# Patient Record
Sex: MALE | URBAN - METROPOLITAN AREA
[De-identification: names, ages, dates, MRNs, and addresses within clinical notes are randomized per-mention and may not be internally consistent; named-entity substitution may affect disease eponyms.]

---

## 2021-04-20 ENCOUNTER — OFFICE VISIT (OUTPATIENT)
Dept: URBAN - METROPOLITAN AREA CLINIC 105 | Facility: CLINIC | Age: 76
End: 2021-04-20
Payer: MEDICARE

## 2021-04-20 ENCOUNTER — LAB OUTSIDE AN ENCOUNTER (OUTPATIENT)
Dept: URBAN - METROPOLITAN AREA CLINIC 105 | Facility: CLINIC | Age: 76
End: 2021-04-20

## 2021-04-20 DIAGNOSIS — K21.9 GASTROESOPHAGEAL REFLUX DISEASE, UNSPECIFIED WHETHER ESOPHAGITIS PRESENT: ICD-10-CM

## 2021-04-20 DIAGNOSIS — R13.12 OROPHARYNGEAL DYSPHAGIA: ICD-10-CM

## 2021-04-20 PROCEDURE — 99214 OFFICE O/P EST MOD 30 MIN: CPT | Performed by: INTERNAL MEDICINE

## 2021-04-20 RX ORDER — ZOLPIDEM TARTRATE 10 MG/1
TAKE 1 TABLET (10 MG) BY ORAL ROUTE ONCE DAILY AT BEDTIME TABLET, FILM COATED ORAL 1
Qty: 0 | Refills: 0 | Status: ACTIVE | COMMUNITY
Start: 1900-01-01

## 2021-04-20 RX ORDER — METOPROLOL TARTRATE 25 MG/1
TAKE 1 TABLET (25 MG) BY ORAL ROUTE ONCE DAILY TABLET, FILM COATED ORAL 1
Qty: 0 | Refills: 0 | Status: ACTIVE | COMMUNITY
Start: 1900-01-01

## 2021-04-20 RX ORDER — OMEPRAZOLE 40 MG/1
1 CAPSULE 30 MINUTES BEFORE MORNING MEAL CAPSULE, DELAYED RELEASE ORAL ONCE A DAY
Qty: 90 | Refills: 1 | OUTPATIENT
Start: 2021-04-20

## 2021-04-20 RX ORDER — ROSUVASTATIN CALCIUM 20 MG/1
1 TABLET TABLET, FILM COATED ORAL ONCE A DAY
Status: ACTIVE | COMMUNITY

## 2021-04-20 RX ORDER — ASPIRIN 81 MG/1
TAKE 1 TABLET (81 MG) BY ORAL ROUTE ONCE DAILY TABLET, COATED ORAL 1
Qty: 0 | Refills: 0 | Status: ACTIVE | COMMUNITY
Start: 1900-01-01

## 2021-04-20 RX ORDER — LEVOTHYROXINE SODIUM 0.1 MG/1
TAKE 1 TABLET (100 MCG) BY ORAL ROUTE ONCE DAILY ON AN EMPTY STOMACH 30 MINUTES BEFORE BREAKFAST TABLET ORAL 1
Qty: 0 | Refills: 0 | Status: ACTIVE | COMMUNITY
Start: 1900-01-01

## 2021-04-20 NOTE — HPI-TODAY'S VISIT:
The patient presents on follow-up after a colonoscopy.  On 1/7/20, he said he had a colon 10 years ago. He said his difficulty swallowing was a complication of the radiation he was receiving for tongue cancer. He had most recently followed at [facility] for this. Dr. Mena at [place] is his PCP. He had heart surgery with Ben Kaminski at [facility] in 2016. Dr. Trinh at [place] was his radiation oncologist. He denied pneumonia from aspiration.   Today, reported symptoms are indigestion, heartburn, and tongue burning. He thinks he took Nexium only once. He says reflux has been an issue for more than 5 years. He thinks he was treated for reflux w/ Dr. Crowell previously. He doesn't drink caffeine beverages or use mint/peppermint. He hasn't had alcohol in a month. He drinks club soda regularly. He admits eating within 3 hours before going to bed.

## 2021-04-23 ENCOUNTER — TELEPHONE ENCOUNTER (OUTPATIENT)
Dept: URBAN - METROPOLITAN AREA CLINIC 105 | Facility: CLINIC | Age: 76
End: 2021-04-23

## 2021-04-23 RX ORDER — OMEPRAZOLE 40 MG/1
1 CAPSULE 30 MINUTES BEFORE MORNING MEAL CAPSULE, DELAYED RELEASE ORAL ONCE A DAY
Qty: 60 | Refills: 5
Start: 2021-04-20

## 2021-05-13 ENCOUNTER — OFFICE VISIT (OUTPATIENT)
Dept: URBAN - METROPOLITAN AREA SURGERY CENTER 16 | Facility: SURGERY CENTER | Age: 76
End: 2021-05-13

## 2021-05-13 PROBLEM — 249496004 ESOPHAGEAL REFLUX: Status: ACTIVE | Noted: 2021-05-13

## 2021-06-07 ENCOUNTER — CLAIMS CREATED FROM THE CLAIM WINDOW (OUTPATIENT)
Dept: URBAN - METROPOLITAN AREA CLINIC 4 | Facility: CLINIC | Age: 76
End: 2021-06-07
Payer: MEDICARE

## 2021-06-07 ENCOUNTER — TELEPHONE ENCOUNTER (OUTPATIENT)
Dept: URBAN - METROPOLITAN AREA CLINIC 92 | Facility: CLINIC | Age: 76
End: 2021-06-07

## 2021-06-07 ENCOUNTER — OFFICE VISIT (OUTPATIENT)
Dept: URBAN - METROPOLITAN AREA SURGERY CENTER 16 | Facility: SURGERY CENTER | Age: 76
End: 2021-06-07
Payer: MEDICARE

## 2021-06-07 DIAGNOSIS — K21.9 ACID REFLUX: ICD-10-CM

## 2021-06-07 DIAGNOSIS — K22.2 ACQUIRED ESOPHAGEAL RING: ICD-10-CM

## 2021-06-07 DIAGNOSIS — K21.9 GASTRO-ESOPHAGEAL REFLUX DISEASE WITHOUT ESOPHAGITIS: ICD-10-CM

## 2021-06-07 DIAGNOSIS — K31.89 MESENTEROAXIAL GASTRIC VOLVULUS: ICD-10-CM

## 2021-06-07 PROCEDURE — 43239 EGD BIOPSY SINGLE/MULTIPLE: CPT | Performed by: INTERNAL MEDICINE

## 2021-06-07 PROCEDURE — 43249 ESOPH EGD DILATION <30 MM: CPT | Performed by: INTERNAL MEDICINE

## 2021-06-07 PROCEDURE — 88305 TISSUE EXAM BY PATHOLOGIST: CPT | Performed by: PATHOLOGY

## 2021-06-07 PROCEDURE — 88312 SPECIAL STAINS GROUP 1: CPT | Performed by: PATHOLOGY

## 2021-06-07 PROCEDURE — G8907 PT DOC NO EVENTS ON DISCHARG: HCPCS | Performed by: INTERNAL MEDICINE

## 2021-06-07 RX ORDER — ASPIRIN 81 MG/1
TAKE 1 TABLET (81 MG) BY ORAL ROUTE ONCE DAILY TABLET, COATED ORAL 1
Qty: 0 | Refills: 0 | Status: ACTIVE | COMMUNITY
Start: 1900-01-01

## 2021-06-07 RX ORDER — METOPROLOL TARTRATE 25 MG/1
TAKE 1 TABLET (25 MG) BY ORAL ROUTE ONCE DAILY TABLET, FILM COATED ORAL 1
Qty: 0 | Refills: 0 | Status: ACTIVE | COMMUNITY
Start: 1900-01-01

## 2021-06-07 RX ORDER — LEVOTHYROXINE SODIUM 0.1 MG/1
TAKE 1 TABLET (100 MCG) BY ORAL ROUTE ONCE DAILY ON AN EMPTY STOMACH 30 MINUTES BEFORE BREAKFAST TABLET ORAL 1
Qty: 0 | Refills: 0 | Status: ACTIVE | COMMUNITY
Start: 1900-01-01

## 2021-06-07 RX ORDER — OMEPRAZOLE 40 MG/1
1 CAPSULE 30 MINUTES BEFORE MORNING MEAL CAPSULE, DELAYED RELEASE ORAL ONCE A DAY
Qty: 60 | Refills: 5 | Status: ACTIVE | COMMUNITY
Start: 2021-04-20

## 2021-06-07 RX ORDER — ZOLPIDEM TARTRATE 10 MG/1
TAKE 1 TABLET (10 MG) BY ORAL ROUTE ONCE DAILY AT BEDTIME TABLET, FILM COATED ORAL 1
Qty: 0 | Refills: 0 | Status: ACTIVE | COMMUNITY
Start: 1900-01-01

## 2021-06-07 RX ORDER — ROSUVASTATIN CALCIUM 20 MG/1
1 TABLET TABLET, FILM COATED ORAL ONCE A DAY
Status: ACTIVE | COMMUNITY

## 2021-07-15 ENCOUNTER — TELEPHONE ENCOUNTER (OUTPATIENT)
Dept: URBAN - METROPOLITAN AREA CLINIC 105 | Facility: CLINIC | Age: 76
End: 2021-07-15

## 2021-07-16 ENCOUNTER — TELEPHONE ENCOUNTER (OUTPATIENT)
Dept: URBAN - METROPOLITAN AREA CLINIC 92 | Facility: CLINIC | Age: 76
End: 2021-07-16

## 2021-07-16 ENCOUNTER — OFFICE VISIT (OUTPATIENT)
Dept: URBAN - METROPOLITAN AREA SURGERY CENTER 16 | Facility: SURGERY CENTER | Age: 76
End: 2021-07-16
Payer: MEDICARE

## 2021-07-16 DIAGNOSIS — R13.10 ABNORMAL DEGLUTITION: ICD-10-CM

## 2021-07-16 DIAGNOSIS — K22.2 ACQUIRED ESOPHAGEAL RING: ICD-10-CM

## 2021-07-16 PROCEDURE — 43249 ESOPH EGD DILATION <30 MM: CPT | Performed by: INTERNAL MEDICINE

## 2021-07-16 PROCEDURE — 43239 EGD BIOPSY SINGLE/MULTIPLE: CPT | Performed by: INTERNAL MEDICINE

## 2021-07-16 PROCEDURE — G8907 PT DOC NO EVENTS ON DISCHARG: HCPCS | Performed by: INTERNAL MEDICINE

## 2021-07-16 RX ORDER — LEVOTHYROXINE SODIUM 0.1 MG/1
TAKE 1 TABLET (100 MCG) BY ORAL ROUTE ONCE DAILY ON AN EMPTY STOMACH 30 MINUTES BEFORE BREAKFAST TABLET ORAL 1
Qty: 0 | Refills: 0 | Status: ACTIVE | COMMUNITY
Start: 1900-01-01

## 2021-07-16 RX ORDER — ROSUVASTATIN CALCIUM 20 MG/1
1 TABLET TABLET, FILM COATED ORAL ONCE A DAY
Status: ACTIVE | COMMUNITY

## 2021-07-16 RX ORDER — METOPROLOL TARTRATE 25 MG/1
TAKE 1 TABLET (25 MG) BY ORAL ROUTE ONCE DAILY TABLET, FILM COATED ORAL 1
Qty: 0 | Refills: 0 | Status: ACTIVE | COMMUNITY
Start: 1900-01-01

## 2021-07-16 RX ORDER — ZOLPIDEM TARTRATE 10 MG/1
TAKE 1 TABLET (10 MG) BY ORAL ROUTE ONCE DAILY AT BEDTIME TABLET, FILM COATED ORAL 1
Qty: 0 | Refills: 0 | Status: ACTIVE | COMMUNITY
Start: 1900-01-01

## 2021-07-16 RX ORDER — OMEPRAZOLE 40 MG/1
1 CAPSULE 30 MINUTES BEFORE MORNING MEAL CAPSULE, DELAYED RELEASE ORAL ONCE A DAY
Qty: 60 | Refills: 5 | Status: ACTIVE | COMMUNITY
Start: 2021-04-20

## 2021-07-16 RX ORDER — ASPIRIN 81 MG/1
TAKE 1 TABLET (81 MG) BY ORAL ROUTE ONCE DAILY TABLET, COATED ORAL 1
Qty: 0 | Refills: 0 | Status: ACTIVE | COMMUNITY
Start: 1900-01-01

## 2021-07-22 ENCOUNTER — TELEPHONE ENCOUNTER (OUTPATIENT)
Dept: URBAN - METROPOLITAN AREA CLINIC 105 | Facility: CLINIC | Age: 76
End: 2021-07-22

## 2021-08-27 ENCOUNTER — OFFICE VISIT (OUTPATIENT)
Dept: URBAN - METROPOLITAN AREA SURGERY CENTER 16 | Facility: SURGERY CENTER | Age: 76
End: 2021-08-27
Payer: MEDICARE

## 2021-08-27 DIAGNOSIS — K22.2 ACQUIRED ESOPHAGEAL RING: ICD-10-CM

## 2021-08-27 PROCEDURE — G8907 PT DOC NO EVENTS ON DISCHARG: HCPCS | Performed by: INTERNAL MEDICINE

## 2021-08-27 PROCEDURE — 43249 ESOPH EGD DILATION <30 MM: CPT | Performed by: INTERNAL MEDICINE

## 2021-08-27 RX ORDER — ASPIRIN 81 MG/1
TAKE 1 TABLET (81 MG) BY ORAL ROUTE ONCE DAILY TABLET, COATED ORAL 1
Qty: 0 | Refills: 0 | Status: ACTIVE | COMMUNITY
Start: 1900-01-01

## 2021-08-27 RX ORDER — METOPROLOL TARTRATE 25 MG/1
TAKE 1 TABLET (25 MG) BY ORAL ROUTE ONCE DAILY TABLET, FILM COATED ORAL 1
Qty: 0 | Refills: 0 | Status: ACTIVE | COMMUNITY
Start: 1900-01-01

## 2021-08-27 RX ORDER — ROSUVASTATIN CALCIUM 20 MG/1
1 TABLET TABLET, FILM COATED ORAL ONCE A DAY
Status: ACTIVE | COMMUNITY

## 2021-08-27 RX ORDER — OMEPRAZOLE 40 MG/1
1 CAPSULE 30 MINUTES BEFORE MORNING MEAL CAPSULE, DELAYED RELEASE ORAL ONCE A DAY
Qty: 60 | Refills: 5 | Status: ACTIVE | COMMUNITY
Start: 2021-04-20

## 2021-08-27 RX ORDER — ZOLPIDEM TARTRATE 10 MG/1
TAKE 1 TABLET (10 MG) BY ORAL ROUTE ONCE DAILY AT BEDTIME TABLET, FILM COATED ORAL 1
Qty: 0 | Refills: 0 | Status: ACTIVE | COMMUNITY
Start: 1900-01-01

## 2021-08-27 RX ORDER — LEVOTHYROXINE SODIUM 0.1 MG/1
TAKE 1 TABLET (100 MCG) BY ORAL ROUTE ONCE DAILY ON AN EMPTY STOMACH 30 MINUTES BEFORE BREAKFAST TABLET ORAL 1
Qty: 0 | Refills: 0 | Status: ACTIVE | COMMUNITY
Start: 1900-01-01

## 2021-12-15 ENCOUNTER — OFFICE VISIT (OUTPATIENT)
Dept: URBAN - METROPOLITAN AREA CLINIC 105 | Facility: CLINIC | Age: 76
End: 2021-12-15
Payer: MEDICARE

## 2021-12-15 VITALS
TEMPERATURE: 97 F | HEART RATE: 98 BPM | DIASTOLIC BLOOD PRESSURE: 62 MMHG | HEIGHT: 70 IN | SYSTOLIC BLOOD PRESSURE: 97 MMHG | WEIGHT: 172.2 LBS | BODY MASS INDEX: 24.65 KG/M2

## 2021-12-15 DIAGNOSIS — K22.2 ESOPHAGEAL STRICTURE: ICD-10-CM

## 2021-12-15 DIAGNOSIS — K21.9 GASTROESOPHAGEAL REFLUX DISEASE, UNSPECIFIED WHETHER ESOPHAGITIS PRESENT: ICD-10-CM

## 2021-12-15 DIAGNOSIS — R13.12 OROPHARYNGEAL DYSPHAGIA: ICD-10-CM

## 2021-12-15 DIAGNOSIS — C02.9 TONGUE CANCER: ICD-10-CM

## 2021-12-15 PROBLEM — 363375006: Status: ACTIVE | Noted: 2021-12-15

## 2021-12-15 PROBLEM — 71457002: Status: ACTIVE | Noted: 2021-04-21

## 2021-12-15 PROCEDURE — 99213 OFFICE O/P EST LOW 20 MIN: CPT | Performed by: INTERNAL MEDICINE

## 2021-12-15 RX ORDER — LEVOTHYROXINE SODIUM 0.1 MG/1
TAKE 1 TABLET (100 MCG) BY ORAL ROUTE ONCE DAILY ON AN EMPTY STOMACH 30 MINUTES BEFORE BREAKFAST TABLET ORAL 1
Qty: 0 | Refills: 0 | Status: ACTIVE | COMMUNITY
Start: 1900-01-01

## 2021-12-15 RX ORDER — FLUDROCORTISONE ACETATE 0.1 MG/1
1 TABLET TABLET ORAL ONCE A DAY
Status: ACTIVE | COMMUNITY

## 2021-12-15 RX ORDER — LORAZEPAM 1 MG/1
1 TABLET AT BEDTIME AS NEEDED TABLET ORAL ONCE A DAY
Status: ACTIVE | COMMUNITY

## 2021-12-15 RX ORDER — ASPIRIN 81 MG/1
TAKE 1 TABLET (81 MG) BY ORAL ROUTE ONCE DAILY TABLET, COATED ORAL 1
Qty: 0 | Refills: 0 | Status: ACTIVE | COMMUNITY
Start: 1900-01-01

## 2021-12-15 RX ORDER — METOPROLOL TARTRATE 25 MG/1
TAKE 1 TABLET (25 MG) BY ORAL ROUTE ONCE DAILY TABLET, FILM COATED ORAL 1
Qty: 0 | Refills: 0 | Status: ON HOLD | COMMUNITY
Start: 1900-01-01

## 2021-12-15 RX ORDER — OMEPRAZOLE 40 MG/1
1 CAPSULE 30 MINUTES BEFORE MORNING MEAL CAPSULE, DELAYED RELEASE ORAL ONCE A DAY
Qty: 60 | Refills: 5 | Status: ACTIVE | COMMUNITY
Start: 2021-04-20

## 2021-12-15 RX ORDER — ZOLPIDEM TARTRATE 10 MG/1
TAKE 1 TABLET (10 MG) BY ORAL ROUTE ONCE DAILY AT BEDTIME TABLET, FILM COATED ORAL 1
Qty: 0 | Refills: 0 | Status: ACTIVE | COMMUNITY
Start: 1900-01-01

## 2021-12-15 RX ORDER — KRILL/OM-3/DHA/EPA/PHOSPHO/AST 1000-230MG
1 TABLET CAPSULE ORAL ONCE A DAY
Status: ACTIVE | COMMUNITY

## 2021-12-15 RX ORDER — ROSUVASTATIN CALCIUM 20 MG/1
1 TABLET TABLET, FILM COATED ORAL ONCE A DAY
Status: ACTIVE | COMMUNITY

## 2021-12-15 NOTE — HPI-TODAY'S VISIT:
The patient presents on follow-up after a colonoscopy.  On 1/7/20, he said he had a colon 10 years ago. He said his difficulty swallowing was a complication of the radiation he was receiving for tongue cancer. He had most recently followed at [facility] for this. Dr. Mena at [place] is his PCP. He had heart surgery with Ben Kaminski at [facility] in 2016. Dr. Trinh at [place] was his radiation oncologist. He denied pneumonia from aspiration.  On 4/20/21, reported symptoms were indigestion, heartburn, and tongue burning. He thought he took Nexium only once. He said reflux had been an issue for more than 5 years. He thought he was treated for reflux w/ Dr. Crowell previously. He did not drink caffeine beverages or use mint/peppermint. He had not had alcohol in a month. He drank club soda regularly. He admitted eating within 3 hours before going to bed.  Today, he says solid-food dysphagia has resolved. He has had four heartburn "attacks" between October and now usually related to alcohol or a high fat food.  He has followed with [facility] because of aspiration.  He props himself up on pillows to sleep and is thinking about buying a mechanical bed to prop him up at night.    Labs 9/7/21 - TSH 0.316. CMP, lipid panel, vit D all normal.

## 2022-04-30 ENCOUNTER — ERX REFILL RESPONSE (OUTPATIENT)
Dept: URBAN - METROPOLITAN AREA CLINIC 105 | Facility: CLINIC | Age: 77
End: 2022-04-30

## 2022-04-30 RX ORDER — OMEPRAZOLE 40 MG/1
1 CAPSULE 30 MINUTES BEFORE MORNING MEAL CAPSULE, DELAYED RELEASE ORAL ONCE A DAY
Qty: 90 | Refills: 4 | OUTPATIENT

## 2022-04-30 RX ORDER — OMEPRAZOLE 40 MG/1
1 CAPSULE 30 MINUTES BEFORE MORNING MEAL CAPSULE, DELAYED RELEASE ORAL ONCE A DAY
Qty: 60 | Refills: 5 | OUTPATIENT

## 2023-03-15 ENCOUNTER — OFFICE VISIT (OUTPATIENT)
Dept: URBAN - METROPOLITAN AREA CLINIC 105 | Facility: CLINIC | Age: 78
End: 2023-03-15
Payer: MEDICARE

## 2023-03-15 VITALS
TEMPERATURE: 96.8 F | HEIGHT: 70 IN | HEART RATE: 93 BPM | WEIGHT: 154.8 LBS | SYSTOLIC BLOOD PRESSURE: 74 MMHG | BODY MASS INDEX: 22.16 KG/M2 | DIASTOLIC BLOOD PRESSURE: 42 MMHG

## 2023-03-15 DIAGNOSIS — R11.0 NAUSEA: ICD-10-CM

## 2023-03-15 DIAGNOSIS — C02.9 TONGUE CANCER: ICD-10-CM

## 2023-03-15 DIAGNOSIS — K21.9 GASTROESOPHAGEAL REFLUX DISEASE, UNSPECIFIED WHETHER ESOPHAGITIS PRESENT: ICD-10-CM

## 2023-03-15 DIAGNOSIS — R13.12 OROPHARYNGEAL DYSPHAGIA: ICD-10-CM

## 2023-03-15 DIAGNOSIS — K22.2 ESOPHAGEAL STRICTURE: ICD-10-CM

## 2023-03-15 PROCEDURE — 99214 OFFICE O/P EST MOD 30 MIN: CPT | Performed by: INTERNAL MEDICINE

## 2023-03-15 RX ORDER — ZOLPIDEM TARTRATE 10 MG/1
TAKE 1 TABLET (10 MG) BY ORAL ROUTE ONCE DAILY AT BEDTIME TABLET, FILM COATED ORAL 1
Qty: 0 | Refills: 0 | Status: ACTIVE | COMMUNITY
Start: 1900-01-01

## 2023-03-15 RX ORDER — LORAZEPAM 1 MG/1
1 TABLET AT BEDTIME AS NEEDED TABLET ORAL ONCE A DAY
Status: ACTIVE | COMMUNITY

## 2023-03-15 RX ORDER — METOPROLOL TARTRATE 25 MG/1
TAKE 1 TABLET (25 MG) BY ORAL ROUTE ONCE DAILY TABLET, FILM COATED ORAL 1
Qty: 0 | Refills: 0 | Status: ON HOLD | COMMUNITY
Start: 1900-01-01

## 2023-03-15 RX ORDER — ASPIRIN 81 MG/1
TAKE 1 TABLET (81 MG) BY ORAL ROUTE ONCE DAILY TABLET, COATED ORAL 1
Qty: 0 | Refills: 0 | Status: ACTIVE | COMMUNITY
Start: 1900-01-01

## 2023-03-15 RX ORDER — ROSUVASTATIN CALCIUM 20 MG/1
1 TABLET TABLET, FILM COATED ORAL ONCE A DAY
Status: ACTIVE | COMMUNITY

## 2023-03-15 RX ORDER — OMEPRAZOLE 40 MG/1
1 CAPSULE 30 MINUTES BEFORE MORNING MEAL CAPSULE, DELAYED RELEASE ORAL ONCE A DAY
Qty: 90 | Refills: 4 | Status: ACTIVE | COMMUNITY

## 2023-03-15 RX ORDER — OMEPRAZOLE 40 MG/1
1 CAPSULE CAPSULE, DELAYED RELEASE ORAL
Qty: 60 | Refills: 2 | OUTPATIENT

## 2023-03-15 RX ORDER — FLUDROCORTISONE ACETATE 0.1 MG/1
1 TABLET TABLET ORAL ONCE A DAY
Status: ACTIVE | COMMUNITY

## 2023-03-15 RX ORDER — LEVOTHYROXINE SODIUM 0.1 MG/1
TAKE 1 TABLET (100 MCG) BY ORAL ROUTE ONCE DAILY ON AN EMPTY STOMACH 30 MINUTES BEFORE BREAKFAST TABLET ORAL 1
Qty: 0 | Refills: 0 | Status: ACTIVE | COMMUNITY
Start: 1900-01-01

## 2023-03-15 RX ORDER — ONDANSETRON 4 MG/1
1 TABLET ON THE TONGUE AND ALLOW TO DISSOLVE TABLET, ORALLY DISINTEGRATING ORAL EVERY 6 HOURS
Qty: 45 TABLETS | Refills: 2 | OUTPATIENT
Start: 2023-03-15

## 2023-03-15 RX ORDER — KRILL/OM-3/DHA/EPA/PHOSPHO/AST 1000-230MG
1 TABLET CAPSULE ORAL ONCE A DAY
Status: ACTIVE | COMMUNITY

## 2023-04-05 ENCOUNTER — OFFICE VISIT (OUTPATIENT)
Dept: URBAN - METROPOLITAN AREA CLINIC 105 | Facility: CLINIC | Age: 78
End: 2023-04-05
Payer: MEDICARE

## 2023-04-05 VITALS
HEIGHT: 70 IN | DIASTOLIC BLOOD PRESSURE: 60 MMHG | WEIGHT: 154 LBS | TEMPERATURE: 97.8 F | SYSTOLIC BLOOD PRESSURE: 93 MMHG | HEART RATE: 96 BPM | BODY MASS INDEX: 22.05 KG/M2

## 2023-04-05 DIAGNOSIS — R13.12 OROPHARYNGEAL DYSPHAGIA: ICD-10-CM

## 2023-04-05 DIAGNOSIS — K21.9 GASTROESOPHAGEAL REFLUX DISEASE, UNSPECIFIED WHETHER ESOPHAGITIS PRESENT: ICD-10-CM

## 2023-04-05 DIAGNOSIS — C02.9 TONGUE CANCER: ICD-10-CM

## 2023-04-05 DIAGNOSIS — K22.2 ESOPHAGEAL STRICTURE: ICD-10-CM

## 2023-04-05 PROCEDURE — 99213 OFFICE O/P EST LOW 20 MIN: CPT | Performed by: INTERNAL MEDICINE

## 2023-04-05 RX ORDER — OMEPRAZOLE 40 MG/1
1 CAPSULE CAPSULE, DELAYED RELEASE ORAL
Qty: 60 | Refills: 2 | Status: ACTIVE | COMMUNITY

## 2023-04-05 RX ORDER — LORAZEPAM 1 MG/1
1 TABLET AT BEDTIME AS NEEDED TABLET ORAL ONCE A DAY
Status: ACTIVE | COMMUNITY

## 2023-04-05 RX ORDER — ROSUVASTATIN CALCIUM 20 MG/1
1 TABLET TABLET, FILM COATED ORAL ONCE A DAY
Status: ACTIVE | COMMUNITY

## 2023-04-05 RX ORDER — METOPROLOL TARTRATE 25 MG/1
TAKE 1 TABLET (25 MG) BY ORAL ROUTE ONCE DAILY TABLET, FILM COATED ORAL 1
Qty: 0 | Refills: 0 | Status: ON HOLD | COMMUNITY
Start: 1900-01-01

## 2023-04-05 RX ORDER — OMEPRAZOLE 40 MG/1
1 CAPSULE CAPSULE, DELAYED RELEASE ORAL
Qty: 180 | Refills: 3 | OUTPATIENT

## 2023-04-05 RX ORDER — ASPIRIN 81 MG/1
TAKE 1 TABLET (81 MG) BY ORAL ROUTE ONCE DAILY TABLET, COATED ORAL 1
Qty: 0 | Refills: 0 | Status: ACTIVE | COMMUNITY
Start: 1900-01-01

## 2023-04-05 RX ORDER — FLUDROCORTISONE ACETATE 0.1 MG/1
1 TABLET TABLET ORAL ONCE A DAY
Status: ACTIVE | COMMUNITY

## 2023-04-05 RX ORDER — KRILL/OM-3/DHA/EPA/PHOSPHO/AST 1000-230MG
1 TABLET CAPSULE ORAL ONCE A DAY
Status: ACTIVE | COMMUNITY

## 2023-04-05 RX ORDER — LEVOTHYROXINE SODIUM 0.1 MG/1
TAKE 1 TABLET (100 MCG) BY ORAL ROUTE ONCE DAILY ON AN EMPTY STOMACH 30 MINUTES BEFORE BREAKFAST TABLET ORAL 1
Qty: 0 | Refills: 0 | Status: ACTIVE | COMMUNITY
Start: 1900-01-01

## 2023-04-05 RX ORDER — ONDANSETRON 4 MG/1
1 TABLET ON THE TONGUE AND ALLOW TO DISSOLVE TABLET, ORALLY DISINTEGRATING ORAL EVERY 6 HOURS
Qty: 45 TABLETS | Refills: 2 | Status: ACTIVE | COMMUNITY
Start: 2023-03-15

## 2023-04-05 RX ORDER — ZOLPIDEM TARTRATE 10 MG/1
TAKE 1 TABLET (10 MG) BY ORAL ROUTE ONCE DAILY AT BEDTIME TABLET, FILM COATED ORAL 1
Qty: 0 | Refills: 0 | Status: ACTIVE | COMMUNITY
Start: 1900-01-01

## 2023-04-05 NOTE — HPI-TODAY'S VISIT:
The patient presents on follow-up after a colonoscopy.  On 1/7/20, he said he had a colon 10 years ago. He said his difficulty swallowing was a complication of the radiation he was receiving for tongue cancer. He had most recently followed at [facility] for this. Dr. Mena at [place] is his PCP. He had heart surgery with Ben Kaminski at [facility] in 2016. Dr. Trinh at [place] was his radiation oncologist. He denied pneumonia from aspiration.  On 4/20/21, reported symptoms were indigestion, heartburn, and tongue burning. He thought he took Nexium only once. He said reflux had been an issue for more than 5 years. He thought he was treated for reflux w/ Dr. Crowell previously. He did not drink caffeine beverages or use mint/peppermint. He had not had alcohol in a month. He drank club soda regularly. He admitted eating within 3 hours before going to bed.  On 12/15/21, he said solid-food dysphagia had resolved. He had had four heartburn "attacks" between October and now usually related to alcohol or a high fat food.  He had followed with [facility] because of aspiration.  He propped himself up on pillows to sleep and was thinking about buying a mechanical bed to prop him up at night.  On 3/15/23, he said he had diarrhea for 3 days while on vacation in [place] at the beginning of this month. Since coming back his diarrhea had resolved, but felt a "queasiness." No emesis. He was eating. He felt queasy daily, around 3x/day. He was taking Prilosec QAM, but not 30-60 min prior to a meal. He took Pepcid AC 4x/week. He did not feel solid food dysphagia after the moment of swallowing.  HPI: Today, he says there is no nausea and he has not needed to take ondansetron. He had recent constipation that resolved. Currently, he says his tongue is sensitive/burns to acidic foods, like ketchup, and he also has an occasional metallic taste in his mouth. He takes omeprazole 40 mg BID - no heartburn/regurgitation or dysphagia.   Labs 9/7/21 - TSH 0.316. CMP, lipid panel, vit D all normal.

## 2023-05-16 ENCOUNTER — LAB OUTSIDE AN ENCOUNTER (OUTPATIENT)
Dept: URBAN - METROPOLITAN AREA CLINIC 105 | Facility: CLINIC | Age: 78
End: 2023-05-16

## 2023-05-16 ENCOUNTER — OFFICE VISIT (OUTPATIENT)
Dept: URBAN - METROPOLITAN AREA CLINIC 105 | Facility: CLINIC | Age: 78
End: 2023-05-16
Payer: MEDICARE

## 2023-05-16 ENCOUNTER — WEB ENCOUNTER (OUTPATIENT)
Dept: URBAN - METROPOLITAN AREA CLINIC 105 | Facility: CLINIC | Age: 78
End: 2023-05-16

## 2023-05-16 VITALS
TEMPERATURE: 97.3 F | HEIGHT: 70 IN | BODY MASS INDEX: 22.33 KG/M2 | SYSTOLIC BLOOD PRESSURE: 105 MMHG | HEART RATE: 87 BPM | DIASTOLIC BLOOD PRESSURE: 65 MMHG | WEIGHT: 156 LBS

## 2023-05-16 DIAGNOSIS — R13.12 OROPHARYNGEAL DYSPHAGIA: ICD-10-CM

## 2023-05-16 DIAGNOSIS — D50.8 ACQUIRED IRON DEFICIENCY ANEMIA DUE TO DECREASED ABSORPTION: ICD-10-CM

## 2023-05-16 DIAGNOSIS — C02.8 MALIGNANT NEOPLASM OF OVERLAPPING SITES OF TONGUE: ICD-10-CM

## 2023-05-16 DIAGNOSIS — K21.9 GASTROESOPHAGEAL REFLUX DISEASE, UNSPECIFIED WHETHER ESOPHAGITIS PRESENT: ICD-10-CM

## 2023-05-16 DIAGNOSIS — K22.2 ESOPHAGEAL STRICTURE: ICD-10-CM

## 2023-05-16 PROBLEM — 235595009 GASTROESOPHAGEAL REFLUX DISEASE: Status: ACTIVE | Noted: 2021-12-15

## 2023-05-16 PROCEDURE — 99214 OFFICE O/P EST MOD 30 MIN: CPT | Performed by: INTERNAL MEDICINE

## 2023-05-16 RX ORDER — KRILL/OM-3/DHA/EPA/PHOSPHO/AST 1000-230MG
1 TABLET CAPSULE ORAL ONCE A DAY
Status: ACTIVE | COMMUNITY

## 2023-05-16 RX ORDER — METOPROLOL TARTRATE 25 MG/1
TAKE 1 TABLET (25 MG) BY ORAL ROUTE ONCE DAILY TABLET, FILM COATED ORAL 1
Qty: 0 | Refills: 0 | Status: ACTIVE | COMMUNITY
Start: 1900-01-01

## 2023-05-16 RX ORDER — ZOLPIDEM TARTRATE 10 MG/1
TAKE 1 TABLET (10 MG) BY ORAL ROUTE ONCE DAILY AT BEDTIME TABLET, FILM COATED ORAL 1
Qty: 0 | Refills: 0 | Status: ACTIVE | COMMUNITY
Start: 1900-01-01

## 2023-05-16 RX ORDER — LEVOTHYROXINE SODIUM 0.1 MG/1
TAKE 1 TABLET (100 MCG) BY ORAL ROUTE ONCE DAILY ON AN EMPTY STOMACH 30 MINUTES BEFORE BREAKFAST TABLET ORAL 1
Qty: 0 | Refills: 0 | Status: ACTIVE | COMMUNITY
Start: 1900-01-01

## 2023-05-16 RX ORDER — LORAZEPAM 1 MG/1
1 TABLET AT BEDTIME AS NEEDED TABLET ORAL ONCE A DAY
Status: ACTIVE | COMMUNITY

## 2023-05-16 RX ORDER — ROSUVASTATIN CALCIUM 20 MG/1
1 TABLET TABLET, FILM COATED ORAL ONCE A DAY
Status: ACTIVE | COMMUNITY

## 2023-05-16 RX ORDER — ASPIRIN 81 MG/1
TAKE 1 TABLET (81 MG) BY ORAL ROUTE ONCE DAILY TABLET, COATED ORAL 1
Qty: 0 | Refills: 0 | Status: ACTIVE | COMMUNITY
Start: 1900-01-01

## 2023-05-16 RX ORDER — FLUDROCORTISONE ACETATE 0.1 MG/1
1 TABLET TABLET ORAL ONCE A DAY
Status: ACTIVE | COMMUNITY

## 2023-05-16 RX ORDER — OMEPRAZOLE 40 MG/1
1 CAPSULE CAPSULE, DELAYED RELEASE ORAL
Qty: 180 | Refills: 3 | Status: ACTIVE | COMMUNITY

## 2023-05-16 RX ORDER — ONDANSETRON 4 MG/1
1 TABLET ON THE TONGUE AND ALLOW TO DISSOLVE TABLET, ORALLY DISINTEGRATING ORAL EVERY 6 HOURS
Qty: 45 TABLETS | Refills: 2 | Status: ACTIVE | COMMUNITY
Start: 2023-03-15

## 2023-05-16 NOTE — HPI-TODAY'S VISIT:
The patient presents on follow-up after a colonoscopy.  On 1/7/20, he said he had a colon 10 years ago. He said his difficulty swallowing was a complication of the radiation he was receiving for tongue cancer. He had most recently followed at [facility] for this. Dr. Mena at [place] is his PCP. He had heart surgery with Ben Kaminski at [facility] in 2016. Dr. Trinh at [place] was his radiation oncologist. He denied pneumonia from aspiration.  On 4/20/21, reported symptoms were indigestion, heartburn, and tongue burning. He thought he took Nexium only once. He said reflux had been an issue for more than 5 years. He thought he was treated for reflux w/ Dr. Crowell previously. He did not drink caffeine beverages or use mint/peppermint. He had not had alcohol in a month. He drank club soda regularly. He admitted eating within 3 hours before going to bed.  On 12/15/21, he said solid-food dysphagia had resolved. He had had four heartburn "attacks" between October and now usually related to alcohol or a high fat food.  He had followed with [facility] because of aspiration.  He propped himself up on pillows to sleep and was thinking about buying a mechanical bed to prop him up at night.  On 3/15/23, he said he had diarrhea for 3 days while on vacation in [place] at the beginning of this month. Since coming back his diarrhea had resolved, but felt a "queasiness." No emesis. He was eating. He felt queasy daily, around 3x/day. He was taking Prilosec QAM, but not 30-60 min prior to a meal. He took Pepcid AC 4x/week. He did not feel solid food dysphagia after the moment of swallowing.  On 4/5/23, he said there was no nausea and he had not needed to take ondansetron. He had recent constipation that resolved. Currently, he said his tongue was sensitive/burns to acidic foods, like ketchup, and he also had an occasional metallic taste in his mouth. He took omeprazole 40 mg BID - no heartburn/regurgitation or dysphagia.  HPI: Today, he says on the BID dose of omeprazole, he only has a rare episode of difficulty swallowing, no heartburn/indigestion. He takes Pepcid AC PRN. His new pulmonologist (Camilo Ann MD) ordered a CT chest which was done on April 10 for aspirating and he has another scheduled for July 19. On April 29, he went to the ER ([facility]) for what he thought were cardiac issues - cath done and was dx'd with aspiration pneumonia and anemia. His new PCP is Gen Levine MD. He has never been told he was anemia prior to his ER visit. No bleeding or melena. He has never taken iron before.   Labs 5/1/23 - CBC normal except hgb 9.1, MCV 74.7. 4/30/23 - CBC normal except hgb 8.4, MCV 74.4. 9/7/21 - TSH 0.316. CMP, lipid panel, vit D all normal.

## 2023-05-17 LAB
A/G RATIO: 1.3
ABSOLUTE BASOPHILS: 50
ABSOLUTE EOSINOPHILS: 914
ABSOLUTE LYMPHOCYTES: 482
ABSOLUTE MONOCYTES: 482
ABSOLUTE NEUTROPHILS: 5270
ALBUMIN: 3.6
ALKALINE PHOSPHATASE: 53
ALT (SGPT): 5
AST (SGOT): 10
BASOPHILS: 0.7
BILIRUBIN, TOTAL: 0.4
BUN/CREATININE RATIO: 19
BUN: 28
CALCIUM: 8.9
CARBON DIOXIDE, TOTAL: 36
CHLORIDE: 98
CREATININE: 1.5
EGFR: 48
EOSINOPHILS: 12.7
FERRITIN, SERUM: 8
GLOBULIN, TOTAL: 2.8
GLUCOSE: 106
HEMATOCRIT: 29.8
HEMOGLOBIN: 9
IRON BIND.CAP.(TIBC): 423
IRON SATURATION: 4
IRON: 16
LYMPHOCYTES: 6.7
MCH: 21.6
MCHC: 30.2
MCV: 71.6
MONOCYTES: 6.7
MPV: 8.4
NEUTROPHILS: 73.2
PLATELET COUNT: 296
POTASSIUM: 3.8
PROTEIN, TOTAL: 6.4
RDW: 15.9
RED BLOOD CELL COUNT: 4.16
SODIUM: 143
WHITE BLOOD CELL COUNT: 7.2

## 2023-05-19 ENCOUNTER — TELEPHONE ENCOUNTER (OUTPATIENT)
Dept: URBAN - METROPOLITAN AREA CLINIC 92 | Facility: CLINIC | Age: 78
End: 2023-05-19

## 2023-06-01 ENCOUNTER — TELEPHONE ENCOUNTER (OUTPATIENT)
Dept: URBAN - METROPOLITAN AREA CLINIC 92 | Facility: CLINIC | Age: 78
End: 2023-06-01

## 2023-06-08 ENCOUNTER — OFFICE VISIT (OUTPATIENT)
Dept: URBAN - METROPOLITAN AREA MEDICAL CENTER 33 | Facility: MEDICAL CENTER | Age: 78
End: 2023-06-08
Payer: MEDICARE

## 2023-06-08 DIAGNOSIS — D50.9 ANEMIA: ICD-10-CM

## 2023-06-08 PROCEDURE — 43235 EGD DIAGNOSTIC BRUSH WASH: CPT | Performed by: INTERNAL MEDICINE

## 2023-06-08 PROCEDURE — 45378 DIAGNOSTIC COLONOSCOPY: CPT | Performed by: INTERNAL MEDICINE

## 2023-06-08 RX ORDER — ROSUVASTATIN CALCIUM 20 MG/1
1 TABLET TABLET, FILM COATED ORAL ONCE A DAY
COMMUNITY

## 2023-06-08 RX ORDER — OMEPRAZOLE 40 MG/1
1 CAPSULE CAPSULE, DELAYED RELEASE ORAL
Qty: 180 | Refills: 3 | COMMUNITY

## 2023-06-08 RX ORDER — METOPROLOL TARTRATE 25 MG/1
TAKE 1 TABLET (25 MG) BY ORAL ROUTE ONCE DAILY TABLET, FILM COATED ORAL 1
Qty: 0 | Refills: 0 | COMMUNITY
Start: 1900-01-01

## 2023-06-08 RX ORDER — ZOLPIDEM TARTRATE 10 MG/1
TAKE 1 TABLET (10 MG) BY ORAL ROUTE ONCE DAILY AT BEDTIME TABLET, FILM COATED ORAL 1
Qty: 0 | Refills: 0 | COMMUNITY
Start: 1900-01-01

## 2023-06-08 RX ORDER — FLUDROCORTISONE ACETATE 0.1 MG/1
1 TABLET TABLET ORAL ONCE A DAY
COMMUNITY

## 2023-06-08 RX ORDER — ONDANSETRON 4 MG/1
1 TABLET ON THE TONGUE AND ALLOW TO DISSOLVE TABLET, ORALLY DISINTEGRATING ORAL EVERY 6 HOURS
Qty: 45 TABLETS | Refills: 2 | COMMUNITY
Start: 2023-03-15

## 2023-06-08 RX ORDER — ASPIRIN 81 MG/1
TAKE 1 TABLET (81 MG) BY ORAL ROUTE ONCE DAILY TABLET, COATED ORAL 1
Qty: 0 | Refills: 0 | COMMUNITY
Start: 1900-01-01

## 2023-06-08 RX ORDER — LORAZEPAM 1 MG/1
1 TABLET AT BEDTIME AS NEEDED TABLET ORAL ONCE A DAY
COMMUNITY

## 2023-06-08 RX ORDER — KRILL/OM-3/DHA/EPA/PHOSPHO/AST 1000-230MG
1 TABLET CAPSULE ORAL ONCE A DAY
COMMUNITY

## 2023-06-08 RX ORDER — LEVOTHYROXINE SODIUM 0.1 MG/1
TAKE 1 TABLET (100 MCG) BY ORAL ROUTE ONCE DAILY ON AN EMPTY STOMACH 30 MINUTES BEFORE BREAKFAST TABLET ORAL 1
Qty: 0 | Refills: 0 | COMMUNITY
Start: 1900-01-01

## 2023-09-27 ENCOUNTER — OFFICE VISIT (OUTPATIENT)
Dept: URBAN - METROPOLITAN AREA CLINIC 105 | Facility: CLINIC | Age: 78
End: 2023-09-27

## 2023-12-18 ENCOUNTER — ERX REFILL RESPONSE (OUTPATIENT)
Dept: URBAN - METROPOLITAN AREA CLINIC 105 | Facility: CLINIC | Age: 78
End: 2023-12-18

## 2023-12-18 RX ORDER — OMEPRAZOLE 40 MG/1
1 CAPSULE CAPSULE, DELAYED RELEASE ORAL
Qty: 180 | Refills: 3 | OUTPATIENT

## 2023-12-22 ENCOUNTER — OFFICE VISIT (OUTPATIENT)
Dept: URBAN - METROPOLITAN AREA CLINIC 105 | Facility: CLINIC | Age: 78
End: 2023-12-22
Payer: MEDICARE

## 2023-12-22 VITALS
SYSTOLIC BLOOD PRESSURE: 79 MMHG | HEART RATE: 93 BPM | DIASTOLIC BLOOD PRESSURE: 49 MMHG | TEMPERATURE: 97.3 F | BODY MASS INDEX: 20.76 KG/M2 | WEIGHT: 145 LBS | HEIGHT: 70 IN

## 2023-12-22 DIAGNOSIS — D50.9 IRON DEFICIENCY ANEMIA, UNSPECIFIED IRON DEFICIENCY ANEMIA TYPE: ICD-10-CM

## 2023-12-22 DIAGNOSIS — C02.9 TONGUE CANCER: ICD-10-CM

## 2023-12-22 DIAGNOSIS — K22.2 ESOPHAGEAL STRICTURE: ICD-10-CM

## 2023-12-22 DIAGNOSIS — K21.9 GASTROESOPHAGEAL REFLUX DISEASE, UNSPECIFIED WHETHER ESOPHAGITIS PRESENT: ICD-10-CM

## 2023-12-22 DIAGNOSIS — R13.12 OROPHARYNGEAL DYSPHAGIA: ICD-10-CM

## 2023-12-22 PROCEDURE — 99213 OFFICE O/P EST LOW 20 MIN: CPT | Performed by: INTERNAL MEDICINE

## 2023-12-22 RX ORDER — ASPIRIN 81 MG/1
TAKE 1 TABLET (81 MG) BY ORAL ROUTE ONCE DAILY TABLET, COATED ORAL 1
Qty: 0 | Refills: 0 | Status: ON HOLD | COMMUNITY
Start: 1900-01-01

## 2023-12-22 RX ORDER — ONDANSETRON 4 MG/1
1 TABLET ON THE TONGUE AND ALLOW TO DISSOLVE TABLET, ORALLY DISINTEGRATING ORAL EVERY 6 HOURS
Qty: 45 TABLETS | Refills: 2 | Status: ACTIVE | COMMUNITY
Start: 2023-03-15

## 2023-12-22 RX ORDER — FLUDROCORTISONE ACETATE 0.1 MG/1
1 TABLET TABLET ORAL ONCE A DAY
COMMUNITY

## 2023-12-22 RX ORDER — OMEPRAZOLE 40 MG/1
1 CAPSULE CAPSULE, DELAYED RELEASE ORAL
Qty: 180 | Refills: 3 | Status: ACTIVE | COMMUNITY

## 2023-12-22 RX ORDER — METOPROLOL TARTRATE 25 MG/1
TAKE 1 TABLET (25 MG) BY ORAL ROUTE ONCE DAILY TABLET, FILM COATED ORAL 1
Qty: 0 | Refills: 0 | Status: ACTIVE | COMMUNITY
Start: 1900-01-01

## 2023-12-22 RX ORDER — LORAZEPAM 1 MG/1
1 TABLET AT BEDTIME AS NEEDED TABLET ORAL ONCE A DAY
Status: ACTIVE | COMMUNITY

## 2023-12-22 RX ORDER — MIDODRINE HYDROCHLORIDE 5 MG/1
TAKE 1 TABLET BY MOUTH FIRST THING IN THE MORNING, THEN 1 TABLET 4 HOURS LATER AND 4 HOURS AFTER THAT. DO NOT LAY SUPINE WITHIN 4 HOURS OF T TABLET ORAL
Qty: 270 UNSPECIFIED | Refills: 0 | Status: ACTIVE | COMMUNITY

## 2023-12-22 RX ORDER — KRILL/OM-3/DHA/EPA/PHOSPHO/AST 1000-230MG
1 TABLET CAPSULE ORAL ONCE A DAY
Status: ACTIVE | COMMUNITY

## 2023-12-22 RX ORDER — ROSUVASTATIN CALCIUM 20 MG/1
1 TABLET TABLET, FILM COATED ORAL ONCE A DAY
Status: ACTIVE | COMMUNITY

## 2023-12-22 RX ORDER — ZOLPIDEM TARTRATE 10 MG/1
TAKE 1 TABLET (10 MG) BY ORAL ROUTE ONCE DAILY AT BEDTIME TABLET, FILM COATED ORAL 1
Qty: 0 | Refills: 0 | Status: ACTIVE | COMMUNITY
Start: 1900-01-01

## 2023-12-22 RX ORDER — LEVOTHYROXINE SODIUM 0.1 MG/1
TAKE 1 TABLET (100 MCG) BY ORAL ROUTE ONCE DAILY ON AN EMPTY STOMACH 30 MINUTES BEFORE BREAKFAST TABLET ORAL 1
Qty: 0 | Refills: 0 | Status: ACTIVE | COMMUNITY
Start: 1900-01-01

## 2023-12-22 NOTE — HPI-TODAY'S VISIT:
The patient presents on follow-up after a colonoscopy.  On 1/7/20, he said he had a colon 10 years ago. He said his difficulty swallowing was a complication of the radiation he was receiving for tongue cancer. He had most recently followed at [facility] for this. Dr. Mena at [place] is his PCP. He had heart surgery with Ben Kaminski at [facility] in 2016. Dr. Trinh at [place] was his radiation oncologist. He denied pneumonia from aspiration.  On 4/20/21, reported symptoms were indigestion, heartburn, and tongue burning. He thought he took Nexium only once. He said reflux had been an issue for more than 5 years. He thought he was treated for reflux w/ Dr. Crowell previously. He did not drink caffeine beverages or use mint/peppermint. He had not had alcohol in a month. He drank club soda regularly. He admitted eating within 3 hours before going to bed.  On 12/15/21, he said solid-food dysphagia had resolved. He had had four heartburn "attacks" between October and now usually related to alcohol or a high fat food.  He had followed with [facility] because of aspiration.  He propped himself up on pillows to sleep and was thinking about buying a mechanical bed to prop him up at night.  On 3/15/23, he said he had diarrhea for 3 days while on vacation in [place] at the beginning of this month. Since coming back his diarrhea had resolved, but felt a "queasiness." No emesis. He was eating. He felt queasy daily, around 3x/day. He was taking Prilosec QAM, but not 30-60 min prior to a meal. He took Pepcid AC 4x/week. He did not feel solid food dysphagia after the moment of swallowing.  On 4/5/23, he said there was no nausea and he had not needed to take ondansetron. He had recent constipation that resolved. Currently, he said his tongue was sensitive/burns to acidic foods, like ketchup, and he also had an occasional metallic taste in his mouth. He took omeprazole 40 mg BID - no heartburn/regurgitation or dysphagia.  On 5/16/23, he said on the BID dose of omeprazole, he only had a rare episode of difficulty swallowing, no heartburn/indigestion. He took Pepcid AC PRN. His new pulmonologist (Camilo Ann MD) ordered a CT chest which was done on April 10 for aspirating and he had another scheduled for July 19. On April 29, he went to the ER ([facility]) for what he thought were cardiac issues - cath done and was dx'd with aspiration pneumonia and anemia. His new PCP was Gen Levine MD. He had never been told he was anemia prior to his ER visit. No bleeding or melena. He had never taken iron before.   HPI: Today, he says he saw a pulmonologist and a bronchoscopy was done which noted a possible infection then referred to a different specialist. This new specialist stated there was a "trace" amount and put him on Cresemba which he continues on. He says this specialist wants to rx posaconazole 100 mg, but wants him to see me first to permit use due to concerns over a possible interaction with omeprazole. He had an appt this week with heme-oncology, labs done. He has not had abdominal surgery. No melena or BRB. He takes baby aspirin. He occasional takes ibuprofen for a headache. He never used oral iron.   He has seen a neurosurgeon and pain specialist for a C3-C5 issue in the neck. He is now on midodrine for bp.  Labs 12/11/23 - CBC normal except hgb 8.6, MCV 78.6. Sed rate 36, CRP 0.53. CMP normal except BUN 30, creatinine 1.44, GFR 50. 12/1/23 - CBC normal except hgb 8.5. BMP normal except sodium 133, BUN 35, creatinine 1.41, GFR 48.5/30/23 - CBC normal except hgb 9.9, MCV 72.1. CMP normal except BUN 30, GFR 53. 5/1/23 - CBC normal except hgb 9.1, MCV 74.7. 5/30/23 - CBC normal except hgb 9.9, MCV 72.1. CMP normal except BUN 30. 4/30/23 - CBC normal except hgb 8.4, MCV 74.4. 9/7/21 - TSH 0.316. CMP, lipid panel, vit D all normal.

## 2023-12-23 PROBLEM — 87522002: Status: ACTIVE | Noted: 2023-05-16

## 2023-12-23 PROBLEM — 63305008: Status: ACTIVE | Noted: 2021-06-07

## 2023-12-24 ENCOUNTER — DASHBOARD ENCOUNTERS (OUTPATIENT)
Age: 78
End: 2023-12-24

## 2024-04-19 ENCOUNTER — OV EP (OUTPATIENT)
Dept: URBAN - METROPOLITAN AREA CLINIC 105 | Facility: CLINIC | Age: 79
End: 2024-04-19

## 2024-06-09 ENCOUNTER — ERX REFILL RESPONSE (OUTPATIENT)
Dept: URBAN - METROPOLITAN AREA CLINIC 105 | Facility: CLINIC | Age: 79
End: 2024-06-09

## 2024-06-09 RX ORDER — ONDANSETRON 4 MG/1
1 TABLET ON THE TONGUE AND ALLOW TO DISSOLVE TABLET, ORALLY DISINTEGRATING ORAL EVERY 6 HOURS
Qty: 45 TABLETS | Refills: 2 | OUTPATIENT

## 2024-06-09 RX ORDER — ONDANSETRON 4 MG/1
1 TABLET ON THE TONGUE AND ALLOW TO DISSOLVE TABLET, ORALLY DISINTEGRATING ORAL
Qty: 30 | Refills: 5 | OUTPATIENT

## 2024-09-26 NOTE — HPI-TODAY'S VISIT:
The patient presents on follow-up after a colonoscopy.  On 1/7/20, he said he had a colon 10 years ago. He said his difficulty swallowing was a complication of the radiation he was receiving for tongue cancer. He had most recently followed at [facility] for this. Dr. Mena at [place] is his PCP. He had heart surgery with Ben Kaminski at [facility] in 2016. Dr. Trinh at [place] was his radiation oncologist. He denied pneumonia from aspiration.  On 4/20/21, reported symptoms were indigestion, heartburn, and tongue burning. He thought he took Nexium only once. He said reflux had been an issue for more than 5 years. He thought he was treated for reflux w/ Dr. Crowell previously. He did not drink caffeine beverages or use mint/peppermint. He had not had alcohol in a month. He drank club soda regularly. He admitted eating within 3 hours before going to bed.  On 12/15/21, he said solid-food dysphagia had resolved. He had had four heartburn "attacks" between October and now usually related to alcohol or a high fat food.  He had followed with [facility] because of aspiration.  He propped himself up on pillows to sleep and was thinking about buying a mechanical bed to prop him up at night.  HPI: Today, he says he had diarrhea for 3 days while on vacation in [place] at the beginning of this month. Since coming back his diarrhea has resolved, but feels a "queasiness." No emesis. He is eating. He feels queasy daily, around 3x/day. He is taking Prilosec QAM, but not 30-60 min prior to a meal. He takes Pepcid AC 4x/week. He does not feel solid food dysphagia after the moment of swallowing.   Labs 9/7/21 - TSH 0.316. CMP, lipid panel, vit D all normal.
[id number]

## 2024-11-05 ENCOUNTER — OFFICE VISIT (OUTPATIENT)
Dept: URBAN - METROPOLITAN AREA CLINIC 105 | Facility: CLINIC | Age: 79
End: 2024-11-05
Payer: MEDICARE

## 2024-11-05 VITALS
BODY MASS INDEX: 19.79 KG/M2 | SYSTOLIC BLOOD PRESSURE: 94 MMHG | HEART RATE: 96 BPM | TEMPERATURE: 96.8 F | HEIGHT: 70 IN | DIASTOLIC BLOOD PRESSURE: 61 MMHG | WEIGHT: 138.2 LBS

## 2024-11-05 DIAGNOSIS — D50.9 IRON DEFICIENCY ANEMIA, UNSPECIFIED IRON DEFICIENCY ANEMIA TYPE: ICD-10-CM

## 2024-11-05 DIAGNOSIS — K21.9 GASTROESOPHAGEAL REFLUX DISEASE, UNSPECIFIED WHETHER ESOPHAGITIS PRESENT: ICD-10-CM

## 2024-11-05 DIAGNOSIS — R13.12 OROPHARYNGEAL DYSPHAGIA: ICD-10-CM

## 2024-11-05 DIAGNOSIS — K59.00 CONSTIPATION, UNSPECIFIED CONSTIPATION TYPE: ICD-10-CM

## 2024-11-05 DIAGNOSIS — K22.2 ESOPHAGEAL STRICTURE: ICD-10-CM

## 2024-11-05 DIAGNOSIS — C02.9 TONGUE CANCER: ICD-10-CM

## 2024-11-05 PROBLEM — 14760008: Status: ACTIVE | Noted: 2024-11-05

## 2024-11-05 PROCEDURE — 99213 OFFICE O/P EST LOW 20 MIN: CPT | Performed by: INTERNAL MEDICINE

## 2024-11-05 RX ORDER — LORAZEPAM 1 MG/1
1 TABLET AT BEDTIME AS NEEDED TABLET ORAL ONCE A DAY
Status: ACTIVE | COMMUNITY

## 2024-11-05 RX ORDER — KRILL/OM-3/DHA/EPA/PHOSPHO/AST 1000-230MG
1 TABLET CAPSULE ORAL ONCE A DAY
Status: ACTIVE | COMMUNITY

## 2024-11-05 RX ORDER — OMEPRAZOLE 40 MG/1
1 CAPSULE CAPSULE, DELAYED RELEASE ORAL
Qty: 180 | Refills: 3 | Status: ACTIVE | COMMUNITY

## 2024-11-05 RX ORDER — ZOLPIDEM TARTRATE 10 MG/1
TAKE 1 TABLET (10 MG) BY ORAL ROUTE ONCE DAILY AT BEDTIME TABLET, FILM COATED ORAL 1
Qty: 0 | Refills: 0 | Status: ACTIVE | COMMUNITY
Start: 1900-01-01

## 2024-11-05 RX ORDER — METOPROLOL TARTRATE 25 MG/1
TAKE 1 TABLET (25 MG) BY ORAL ROUTE ONCE DAILY TABLET, FILM COATED ORAL 1
Qty: 0 | Refills: 0 | Status: ACTIVE | COMMUNITY
Start: 1900-01-01

## 2024-11-05 RX ORDER — ASPIRIN 81 MG/1
TAKE 1 TABLET (81 MG) BY ORAL ROUTE ONCE DAILY TABLET, COATED ORAL 1
Qty: 0 | Refills: 0 | Status: ACTIVE | COMMUNITY
Start: 1900-01-01

## 2024-11-05 RX ORDER — MIDODRINE HYDROCHLORIDE 5 MG/1
TAKE 1 TABLET BY MOUTH FIRST THING IN THE MORNING, THEN 1 TABLET 4 HOURS LATER AND 4 HOURS AFTER THAT. DO NOT LAY SUPINE WITHIN 4 HOURS OF T TABLET ORAL
Qty: 270 UNSPECIFIED | Refills: 0 | Status: ACTIVE | COMMUNITY

## 2024-11-05 RX ORDER — OMEPRAZOLE 40 MG/1
1 CAPSULE CAPSULE, DELAYED RELEASE ORAL
Qty: 180 | Refills: 3 | OUTPATIENT

## 2024-11-05 RX ORDER — ONDANSETRON 4 MG/1
1 TABLET ON THE TONGUE AND ALLOW TO DISSOLVE TABLET, ORALLY DISINTEGRATING ORAL
Qty: 30 | Refills: 5 | Status: ACTIVE | COMMUNITY

## 2024-11-05 RX ORDER — SODIUM CHLORIDE TAB 1 GM 1 G
AS DIRECTED TAB MISCELLANEOUS
Status: ACTIVE | COMMUNITY

## 2024-11-05 RX ORDER — FLUDROCORTISONE ACETATE 0.1 MG/1
1 TABLET TABLET ORAL ONCE A DAY
COMMUNITY

## 2024-11-05 RX ORDER — LEVOTHYROXINE SODIUM 0.1 MG/1
TAKE 1 TABLET (100 MCG) BY ORAL ROUTE ONCE DAILY ON AN EMPTY STOMACH 30 MINUTES BEFORE BREAKFAST TABLET ORAL 1
Qty: 0 | Refills: 0 | Status: ACTIVE | COMMUNITY
Start: 1900-01-01

## 2024-11-05 RX ORDER — ROSUVASTATIN 20 MG/1
1 TABLET TABLET, FILM COATED ORAL ONCE A DAY
Status: ACTIVE | COMMUNITY

## 2024-11-05 NOTE — HPI-TODAY'S VISIT:
The patient presents on follow-up after a colonoscopy.  On 1/7/20, he said he had a colon 10 years ago. He said his difficulty swallowing was a complication of the radiation he was receiving for tongue cancer. He had most recently followed at [facility] for this. Dr. eMna at [place] is his PCP. He had heart surgery with Ben Kaminski at [facility] in 2016. Dr. Trinh at [place] was his radiation oncologist. He denied pneumonia from aspiration.  On 4/20/21, reported symptoms were indigestion, heartburn, and tongue burning. He thought he took Nexium only once. He said reflux had been an issue for more than 5 years. He thought he was treated for reflux w/ Dr. Crowell previously. He did not drink caffeine beverages or use mint/peppermint. He had not had alcohol in a month. He drank club soda regularly. He admitted eating within 3 hours before going to bed.  On 12/15/21, he said solid-food dysphagia had resolved. He had had four heartburn "attacks" between October and now usually related to alcohol or a high fat food.  He had followed with [facility] because of aspiration.  He propped himself up on pillows to sleep and was thinking about buying a mechanical bed to prop him up at night.  On 3/15/23, he said he had diarrhea for 3 days while on vacation in [place] at the beginning of this month. Since coming back his diarrhea had resolved, but felt a "queasiness." No emesis. He was eating. He felt queasy daily, around 3x/day. He was taking Prilosec QAM, but not 30-60 min prior to a meal. He took Pepcid AC 4x/week. He did not feel solid food dysphagia after the moment of swallowing.  On 4/5/23, he said there was no nausea and he had not needed to take ondansetron. He had recent constipation that resolved. Currently, he said his tongue was sensitive/burns to acidic foods, like ketchup, and he also had an occasional metallic taste in his mouth. He took omeprazole 40 mg BID - no heartburn/regurgitation or dysphagia.  On 5/16/23, he said on the BID dose of omeprazole, he only had a rare episode of difficulty swallowing, no heartburn/indigestion. He took Pepcid AC PRN. His new pulmonologist (Camilo Ann MD) ordered a CT chest which was done on April 10 for aspirating and he had another scheduled for July 19. On April 29, he went to the ER ([facility]) for what he thought were cardiac issues - cath done and was dx'd with aspiration pneumonia and anemia. His new PCP was Gen Levine MD. He had never been told he was anemia prior to his ER visit. No bleeding or melena. He had never taken iron before.   On 12/22/23, he said he saw a pulmonologist and a bronchoscopy was done which noted a possible infection then referred to a different specialist. This new specialist stated there was a "trace" amount and put him on Cresemba which he continued on. He said this specialist wanted to rx posaconazole 100 mg, but wanted him to see me first to permit use due to concerns over a possible interaction with omeprazole. He had an appt this week with heme-oncology, labs done. He had not had abdominal surgery. No melena or BRB. He took baby aspirin. He occasional took ibuprofen for a headache. He never used oral iron.   He had seen a neurosurgeon and pain specialist for a C3-C5 issue in the neck. He was now on midodrine for bp.  HPI Today, he says 1x/year he coughs up more phlegm than usual - pulmonologist (Dr. Zaragoza) rx'd levofloxacin which helped. He continues on omeprazole 40 mg BID. 8 days ago, he started having daily intermittent abdominal discomfort and constipation. He has not had a BM in 4 days. He started on daily Miralax 2 days ago. He notes a history of constipation. He is on iron. He received an iron infusion at some point. He has a follow-up appt with hematologist in 2 weeks. Recent labs done with Dr. Levine.  Labs 9/11/24 - B12 >2000. 8/29/24 - CBC normal except WBC 12.6, hgb 13. CMP normal except sodium 129, albumin 3.4, ALT 4, GFR 58. Iron 44, iron sat 14%, TIBC 305, ferritin 74. Vit D 27. 12/11/23 - CBC normal except hgb 8.6, MCV 78.6. Sed rate 36, CRP 0.53. CMP normal except BUN 30, creatinine 1.44, GFR 50. 12/1/23 - CBC normal except hgb 8.5. BMP normal except sodium 133, BUN 35, creatinine 1.41, GFR 48.5/30/23 - CBC normal except hgb 9.9, MCV 72.1. CMP normal except BUN 30, GFR 53. 5/1/23 - CBC normal except hgb 9.1, MCV 74.7. 5/30/23 - CBC normal except hgb 9.9, MCV 72.1. CMP normal except BUN 30. 4/30/23 - CBC normal except hgb 8.4, MCV 74.4. 9/7/21 - TSH 0.316. CMP, lipid panel, vit D all normal.